# Patient Record
Sex: FEMALE | Race: BLACK OR AFRICAN AMERICAN | NOT HISPANIC OR LATINO | Employment: OTHER
[De-identification: names, ages, dates, MRNs, and addresses within clinical notes are randomized per-mention and may not be internally consistent; named-entity substitution may affect disease eponyms.]

---

## 2018-03-21 ENCOUNTER — RECORDS - HEALTHEAST (OUTPATIENT)
Dept: ADMINISTRATIVE | Facility: OTHER | Age: 48
End: 2018-03-21

## 2020-10-19 ENCOUNTER — HOSPITAL ENCOUNTER (EMERGENCY)
Facility: CLINIC | Age: 50
Discharge: HOME | End: 2020-10-20
Attending: EMERGENCY MEDICINE | Admitting: EMERGENCY MEDICINE
Payer: MEDICARE

## 2020-10-19 DIAGNOSIS — F41.0 PANIC ATTACK: ICD-10-CM

## 2020-10-19 LAB
ALBUMIN SERPL-MCNC: 3.5 G/DL (ref 3.4–5)
ALCOHOL BREATH TEST: 0.1 (ref 0–0.01)
ALP SERPL-CCNC: 71 U/L (ref 40–150)
ALT SERPL W P-5'-P-CCNC: 28 U/L (ref 0–50)
ANION GAP SERPL CALCULATED.3IONS-SCNC: 9 MMOL/L (ref 3–14)
AST SERPL W P-5'-P-CCNC: 29 U/L (ref 0–45)
BASOPHILS # BLD AUTO: 0 10E9/L (ref 0–0.2)
BASOPHILS NFR BLD AUTO: 0.7 %
BILIRUB SERPL-MCNC: 0.5 MG/DL (ref 0.2–1.3)
BUN SERPL-MCNC: 8 MG/DL (ref 7–30)
CA-I BLD-SCNC: 4.5 MG/DL (ref 4.4–5.2)
CALCIUM SERPL-MCNC: 8.6 MG/DL (ref 8.5–10.1)
CHLORIDE SERPL-SCNC: 109 MMOL/L (ref 94–109)
CO2 BLDCOV-SCNC: 18 MMOL/L (ref 21–28)
CO2 SERPL-SCNC: 18 MMOL/L (ref 20–32)
CREAT SERPL-MCNC: 0.8 MG/DL (ref 0.52–1.04)
DIFFERENTIAL METHOD BLD: ABNORMAL
EOSINOPHIL # BLD AUTO: 0.1 10E9/L (ref 0–0.7)
EOSINOPHIL NFR BLD AUTO: 1.2 %
ERYTHROCYTE [DISTWIDTH] IN BLOOD BY AUTOMATED COUNT: 15.5 % (ref 10–15)
ETHANOL SERPL-MCNC: 0.15 G/DL
GFR SERPL CREATININE-BSD FRML MDRD: 87 ML/MIN/{1.73_M2}
GLUCOSE BLD-MCNC: 99 MG/DL (ref 70–99)
GLUCOSE SERPL-MCNC: 94 MG/DL (ref 70–99)
HCT VFR BLD AUTO: 38.4 % (ref 35–47)
HCT VFR BLD CALC: 39 %PCV (ref 35–47)
HGB BLD CALC-MCNC: 13.3 G/DL (ref 11.7–15.7)
HGB BLD-MCNC: 12.4 G/DL (ref 11.7–15.7)
IMM GRANULOCYTES # BLD: 0 10E9/L (ref 0–0.4)
IMM GRANULOCYTES NFR BLD: 0.2 %
LYMPHOCYTES # BLD AUTO: 2.4 10E9/L (ref 0.8–5.3)
LYMPHOCYTES NFR BLD AUTO: 40.5 %
MCH RBC QN AUTO: 28.1 PG (ref 26.5–33)
MCHC RBC AUTO-ENTMCNC: 32.3 G/DL (ref 31.5–36.5)
MCV RBC AUTO: 87 FL (ref 78–100)
MONOCYTES # BLD AUTO: 0.4 10E9/L (ref 0–1.3)
MONOCYTES NFR BLD AUTO: 6.8 %
NEUTROPHILS # BLD AUTO: 3 10E9/L (ref 1.6–8.3)
NEUTROPHILS NFR BLD AUTO: 50.6 %
NRBC # BLD AUTO: 0 10*3/UL
NRBC BLD AUTO-RTO: 0 /100
PCO2 BLDV: 26 MM HG (ref 40–50)
PH BLDV: 7.44 PH (ref 7.32–7.43)
PLATELET # BLD AUTO: 438 10E9/L (ref 150–450)
PO2 BLDV: 50 MM HG (ref 25–47)
POTASSIUM BLD-SCNC: 4.3 MMOL/L (ref 3.4–5.3)
POTASSIUM SERPL-SCNC: 4 MMOL/L (ref 3.4–5.3)
PROT SERPL-MCNC: 7.2 G/DL (ref 6.8–8.8)
RBC # BLD AUTO: 4.42 10E12/L (ref 3.8–5.2)
SAO2 % BLDV FROM PO2: 87 %
SODIUM BLD-SCNC: 137 MMOL/L (ref 133–144)
SODIUM SERPL-SCNC: 137 MMOL/L (ref 133–144)
WBC # BLD AUTO: 5.9 10E9/L (ref 4–11)

## 2020-10-19 PROCEDURE — 80053 COMPREHEN METABOLIC PANEL: CPT | Performed by: EMERGENCY MEDICINE

## 2020-10-19 PROCEDURE — 250N000011 HC RX IP 250 OP 636: Performed by: EMERGENCY MEDICINE

## 2020-10-19 PROCEDURE — 999N001077 HC STATISTIC POTASSIUM ED POCT

## 2020-10-19 PROCEDURE — 99285 EMERGENCY DEPT VISIT HI MDM: CPT | Mod: 25 | Performed by: EMERGENCY MEDICINE

## 2020-10-19 PROCEDURE — 99285 EMERGENCY DEPT VISIT HI MDM: CPT | Performed by: EMERGENCY MEDICINE

## 2020-10-19 PROCEDURE — 999N001079 HC STATISTIC HEMATOCRIT ED POCT

## 2020-10-19 PROCEDURE — 82330 ASSAY OF CALCIUM: CPT

## 2020-10-19 PROCEDURE — 80307 DRUG TEST PRSMV CHEM ANLYZR: CPT | Performed by: EMERGENCY MEDICINE

## 2020-10-19 PROCEDURE — 80320 DRUG SCREEN QUANTALCOHOLS: CPT | Performed by: EMERGENCY MEDICINE

## 2020-10-19 PROCEDURE — 82803 BLOOD GASES ANY COMBINATION: CPT

## 2020-10-19 PROCEDURE — 999N001076 HC STATISTIC SODIUM ED POCT

## 2020-10-19 PROCEDURE — 999N001080 HC STATISTIC GLUCOSE ED POCT

## 2020-10-19 PROCEDURE — 96374 THER/PROPH/DIAG INJ IV PUSH: CPT | Performed by: EMERGENCY MEDICINE

## 2020-10-19 PROCEDURE — 85025 COMPLETE CBC W/AUTO DIFF WBC: CPT | Performed by: EMERGENCY MEDICINE

## 2020-10-19 RX ORDER — SODIUM CHLORIDE 9 MG/ML
INJECTION, SOLUTION INTRAVENOUS CONTINUOUS
Status: DISCONTINUED | OUTPATIENT
Start: 2020-10-20 | End: 2020-10-20 | Stop reason: HOSPADM

## 2020-10-19 RX ORDER — LORAZEPAM 2 MG/ML
0.25 INJECTION INTRAMUSCULAR ONCE
Status: COMPLETED | OUTPATIENT
Start: 2020-10-19 | End: 2020-10-19

## 2020-10-19 RX ADMIN — LORAZEPAM 0.25 MG: 2 INJECTION INTRAMUSCULAR; INTRAVENOUS at 23:18

## 2020-10-19 ASSESSMENT — MIFFLIN-ST. JEOR: SCORE: 1204.22

## 2020-10-19 NOTE — ED AVS SNAPSHOT
[facility] Emergency Department  [address] [address]  Phone: [phone]                                    Sarah Garcias   MRN: [id number]    Department: [facility] Emergency Department   Date of Visit: 10/19/2020           After Visit Summary Signature Page    I have received my discharge instructions, and my questions have been answered. I have discussed any challenges I see with this plan with the nurse or doctor.    ..........................................................................................................................................  Patient/Patient Representative Signature      ..........................................................................................................................................  Patient Representative Print Name and Relationship to Patient    ..................................................               ................................................  Date                                   Time    ..........................................................................................................................................  Reviewed by Signature/Title    ...................................................              ..............................................  Date                                               Time          22EPIC Rev 08/18

## 2020-10-20 VITALS
DIASTOLIC BLOOD PRESSURE: 88 MMHG | SYSTOLIC BLOOD PRESSURE: 126 MMHG | OXYGEN SATURATION: 100 % | HEIGHT: 60 IN | BODY MASS INDEX: 28.47 KG/M2 | HEART RATE: 84 BPM | RESPIRATION RATE: 18 BRPM | TEMPERATURE: 96 F | WEIGHT: 145 LBS

## 2020-10-20 LAB
AMPHETAMINES UR QL SCN: NEGATIVE
BARBITURATES UR QL: NEGATIVE
BENZODIAZ UR QL: NEGATIVE
CANNABINOIDS UR QL SCN: POSITIVE
COCAINE UR QL: NEGATIVE
ETHANOL UR QL SCN: POSITIVE
OPIATES UR QL SCN: NEGATIVE

## 2020-10-20 PROCEDURE — 258N000003 HC RX IP 258 OP 636: Performed by: EMERGENCY MEDICINE

## 2020-10-20 PROCEDURE — 96361 HYDRATE IV INFUSION ADD-ON: CPT | Performed by: EMERGENCY MEDICINE

## 2020-10-20 RX ADMIN — SODIUM CHLORIDE 1000 ML: 9 INJECTION, SOLUTION INTRAVENOUS at 00:17

## 2020-10-20 NOTE — ED PROVIDER NOTES
"      [place] EMERGENCY DEPARTMENT ([facility])  October 19, 2020  History     Chief Complaint   Patient presents with     Panic Attack     The history is provided by the patient and a relative (Pt's sister). The history is limited by the condition of the patient.     Rosalina Garcias is a 50 year old female who presents to the ED today complaining of a panic attack.  Per patient sister, patient's daughter is on the fourth floor ICU of the hospital and the patient is being asked to make end of life decisions about possibly going to comfort care which has been extremely hard on the patient.  Sister reports this triggered a panic attack which has been going on since yesterday at 5 PM.  She reports she has not ate or slept in 24 hours.  Patient reports she drank \"just a little\" of alcohol today.  Patient sister reports she is prescribed hydroxyzine as needed for anxiety, but usually does not take it.  Per patient's sister, she does not believe she is taking her prescribed medication for depression.  Patient denies self-harm, drug use, recent trauma, pain, recent coronavirus exposure, or suicidal ideation or homicidal ideation. Sister confirms that the patient has not made an threats of suicide. Further history is limited due to the condition of the patient.     I have reviewed the Medications, Allergies, Past Medical and Surgical History, and Social History in the [employer] system.  PAST MEDICAL HISTORY: History reviewed. No pertinent past medical history.    PAST SURGICAL HISTORY: History reviewed. No pertinent surgical history.    Past medical history, past surgical history, medications, and allergies were reviewed with the patient. Additional pertinent items: None    FAMILY HISTORY: History reviewed. No pertinent family history.    SOCIAL HISTORY:   Social History     Tobacco Use     Smoking status: Not on file   Substance Use Topics     Alcohol use: Yes     Social history was reviewed with the patient. " Additional pertinent items: None      There are no discharge medications for this patient.         Allergies   Allergen Reactions     Percocet [Oxycodone-Acetaminophen] Anaphylaxis        Review of Systems  A complete review of systems was attempted but limited due to significant anxiety, tearful, screaming, hyperventilating.    Physical Exam   BP: (!) 139/102  Pulse: 118  Temp: 96  F (35.6  C)  Resp: (!) 50  Height: 152.4 cm (5')  Weight: 65.8 kg (145 lb)  SpO2: 94 %      Physical Exam  Vitals signs reviewed.   Constitutional:       Appearance: She is well-developed.      Comments: Significant anxious, screaming, crying, hyperventilating    HENT:      Head: Normocephalic and atraumatic.      Mouth/Throat:      Mouth: Mucous membranes are moist.      Pharynx: No oropharyngeal exudate.   Eyes:      Extraocular Movements: Extraocular movements intact.      Conjunctiva/sclera: Conjunctivae normal.      Pupils: Pupils are equal, round, and reactive to light.   Neck:      Musculoskeletal: Normal range of motion and neck supple. No neck rigidity.   Cardiovascular:      Rate and Rhythm: Regular rhythm. Tachycardia present.      Pulses: Normal pulses.      Heart sounds: Normal heart sounds. No murmur.   Pulmonary:      Effort: No respiratory distress.      Breath sounds: Normal breath sounds. No stridor. No wheezing or rales.      Comments: Hyperventilating due to anxiety but no signs of respiratory distress. No accessory muscle use. Screaming and crying. Satting normally on room air  Abdominal:      General: Bowel sounds are normal. There is no distension.      Palpations: Abdomen is soft. There is no mass.      Tenderness: There is no abdominal tenderness. There is no guarding or rebound.   Musculoskeletal: Normal range of motion.   Skin:     General: Skin is warm and dry.      Capillary Refill: Capillary refill takes less than 2 seconds.      Findings: No rash.   Neurological:      General: No focal deficit present.       Mental Status: She is alert and oriented to person, place, and time.      GCS: GCS eye subscore is 4. GCS verbal subscore is 5. GCS motor subscore is 6.      Cranial Nerves: No cranial nerve deficit.      Sensory: No sensory deficit.      Motor: No weakness.   Psychiatric:         Mood and Affect: Mood is anxious. Affect is tearful.      Comments: Very very anxious, hyperventilating, screaming and crying about her daughter         ED Course   10:16 PM  The patient was seen and examined by Amanda Zavala MD in Room ED06.        Procedures                 Labs Ordered and Resulted from Time of ED Arrival Up to the Time of Departure from the ED   CBC WITH PLATELETS DIFFERENTIAL - Abnormal; Notable for the following components:       Result Value    RDW 15.5 (*)     All other components within normal limits   COMPREHENSIVE METABOLIC PANEL - Abnormal; Notable for the following components:    Carbon Dioxide 18 (*)     All other components within normal limits   ALCOHOL ETHYL - Abnormal; Notable for the following components:    Ethanol g/dL 0.15 (*)     All other components within normal limits   DRUG ABUSE SCREEN 6 CHEM DEP URINE ([facility]) - Abnormal; Notable for the following components:    Cannabinoids Qual Urine Positive (*)     Ethanol Qual Urine Positive (*)     All other components within normal limits   ALCOHOL BREATH TEST POCT - Abnormal; Notable for the following components:    Alcohol Breath Test 0.10 (*)     All other components within normal limits   ISTAT GASES ELEC ICA GLUC NAHED POCT - Abnormal; Notable for the following components:    Ph Venous 7.44 (*)     PCO2 Venous 26 (*)     PO2 Venous 50 (*)     Bicarbonate Venous 18 (*)     All other components within normal limits   ISTAT CG8 GAS ELEC ICA GLUC NAHED NURSE POCT       Medications   LORazepam (ATIVAN) injection 0.25 mg (0.25 mg Intravenous Given 10/19/20 8534)   0.9% sodium chloride BOLUS (0 mLs Intravenous Stopped 10/20/20 0105)             Assessments &  Plan (with Medical Decision Making)   Patient presents with significant anxiety and she is tearful and screaming and hyperventilating talking about her daughter who is dying unfortunately.  This was provoked by her having to have a discussion about potential end-of-life care and comfort care and stopping aggressive measures.  Sister is present with the patient and states that this has been worsening over the past 24 hours and she does have prior history of anxiety and panic attacks.  She apparently has been prescribed hydroxyzine but does not use this.  Patient also reports that she has had some alcohol today.  Patient herself denies any suicidal ideation and the sister also states she has not heard her make any suicidal statements or any gestures.  Patient is hypertensive and tachycardic and hyperventilating related to her significant anxiety.  Though she was given a small amount of 0.25 mg of IV Ativan to try to decrease her anxiety and hyperventilating however wanted to be careful with the fact that she had alcohol on board.  Her breathalyzer was 0.10.  We did obtain basic laboratory studies.  With her hyperventilation I did obtain a VBG which reveals a pH of 7.44 with a PCO2 of 26.  Ativan was significantly helpful and patient was able to calm down and have a full conversation again stating she was not suicidal or homicidal and was feeling much better.  She was describing the stress she was under related to her daughter.  She denies any medical complaints at all at this time.  Remainder of her laboratory studies are largely unremarkable.  She was given IV fluids here and monitored.  Her vital signs returned to normal limits.  Again she was feeling much improved.  We discussed having her speak with one of our behavioral health 's for further resources however she declined this and stated that she would like to go home.  Again her sister stated that she had no concerns for her safety at home and that she  does have a primary care provider that she can follow-up closely with for further discussion and was in agreement with taking her home and states she is going to stay with her for the time being.  They are not going to make any end-of-life decisions today for her daughter.  She was given strict return precautions.  Patient and her sister were in agreement with this plan.  She was discharged in improved condition.    I have reviewed the nursing notes.    I have reviewed the findings, diagnosis, plan and need for follow up with the patient.    There are no discharge medications for this patient.      Final diagnoses:   Panic attack   I, Abdirahman Deleon, am serving as a trained medical scribe to document services personally performed by Amanda Zavala MD, based on the provider's statements to me.     I, Amanda Zavala MD, was physically present and have reviewed and verified the accuracy of this note documented by Abdirahman Deleon.      10/19/2020   [facility] EMERGENCY DEPARTMENT     Amanda Zavala MD  11/15/20 5650

## 2020-10-20 NOTE — DISCHARGE INSTRUCTIONS
Please make an appointment to follow up with Your Primary Care Provider as soon as possible.    Return to the ED if you develop thoughts of harming yourself or others or any new or worsening concerns.

## 2020-10-20 NOTE — ED NOTES
Pt states that she is going home with her sister, Lashell, who is at bedside with her.  They confirm that they are not going to make any of the end of life decisions tonight, as she does have benzodiazepines and ETOH on board.

## 2020-10-20 NOTE — ED NOTES
Pt is alert, talking on the phone.  She is much calmer than on arrival. She continues to deny suicidal ideation.  The family is in contact with our facility  regarding end of life plans for her daughter. She states that she does not want to stay for DEC.  Will update provider.

## 2020-10-20 NOTE — ED TRIAGE NOTES
Pt presents through triage with extreme anxiety/distress r/t needing to take daughter off life support. Pt has been hyperventilating and drinking per family.

## 2021-05-26 ENCOUNTER — RECORDS - HEALTHEAST (OUTPATIENT)
Dept: ADMINISTRATIVE | Facility: CLINIC | Age: 51
End: 2021-05-26

## 2021-05-27 ENCOUNTER — RECORDS - HEALTHEAST (OUTPATIENT)
Dept: ADMINISTRATIVE | Facility: CLINIC | Age: 51
End: 2021-05-27

## 2021-05-28 ENCOUNTER — RECORDS - HEALTHEAST (OUTPATIENT)
Dept: ADMINISTRATIVE | Facility: CLINIC | Age: 51
End: 2021-05-28

## 2021-05-29 ENCOUNTER — RECORDS - HEALTHEAST (OUTPATIENT)
Dept: ADMINISTRATIVE | Facility: CLINIC | Age: 51
End: 2021-05-29

## 2021-06-02 ENCOUNTER — RECORDS - HEALTHEAST (OUTPATIENT)
Dept: ADMINISTRATIVE | Facility: CLINIC | Age: 51
End: 2021-06-02

## 2021-11-02 ENCOUNTER — TELEPHONE (OUTPATIENT)
Dept: NEUROSURGERY | Facility: CLINIC | Age: 51
End: 2021-11-02
Payer: MEDICARE

## 2021-11-02 NOTE — TELEPHONE ENCOUNTER
[facility] Call Center    Phone Message    May a detailed message be left on voicemail: yes     Reason for Call: Appointment Intake    Referring Provider Name: Self referral/2nd opinion  Diagnosis and/or Symptoms:  2nd opinion- surgery complication related to sciatica- all records with [facility]-should be available via Care Everywhere-     Patient would like her lumbar pain.   Writer was unsure if patient should be seen in [facility] or if she should see a neurosurgereon. Patient main complaint is difficulty walking do to sciatica pain and pressure post surgery.    Please advise.     Action Taken: Message routed to:  [facility] [place] [facility]    Travel Screening: Not Applicable

## 2021-11-08 NOTE — TELEPHONE ENCOUNTER
2nd request to schedule an appointment. No call back yet. Please contact.   Electrodesiccation And Curettage Text: The wound bed was treated with electrodesiccation and curettage after the biopsy was performed.

## 2021-11-15 NOTE — TELEPHONE ENCOUNTER
SPINE PATIENTS - NEW PROTOCOL PREVISIT    RECORDS RECEIVED FROM: Self   REASON FOR VISIT: 2ND OPINION/ Surgery complication related to sciatica   Date of Appt: 11/18/21   NOTES (FOR ALL VISITS) STATUS DETAILS   OFFICE NOTE from referring provider N/A    OFFICE NOTE from other specialist Care Everywhere Dr Srinivas Cabral @ [facility] Neurosurgery:  10/12/21  8/10/21   DISCHARGE SUMMARY from hospital N/A    DISCHARGE REPORT from ER Care Everywhere [facility]:  6/23/21  Exploration and repair of lumbar CSF leak    [facility]:  6/17/21  IRRIGATION AND DEBRIDEMENT L4-L5 laminectomy wound and evacuation of post-op hematoma    [facility]:  6/9/21  L4-L5 posterior decompression, inadvertant durotomy   EMG REPORT N/A    MEDICATION LIST Care Everywhere    IMAGING  (FOR ALL VISITS)     MRI (HEAD, NECK, SPINE) Received [facility]:  MRI Lumbar Spine 9/1/21  MRI Lumbar Spine 6/22/21  MRI Lumbar Spine 6/16/21    [facility]:  MRI Lumbar Spine 5/18/21  MRI Lumbar Spine 2/22/21   XRAY (SPINE) *NEUROSURGERY* Received [facility]:  XR Lumbar Spine 10/12/21   CT (HEAD, NECK, SPINE) N/A       Action 11/15/21 MV 9.55am   Action Taken Imaging request faxed to [facility] for:  MRI Lumbar Spine 9/1/21  MRI Lumbar Spine 6/22/21  MRI Lumbar Spine 6/16/21    Imaging request faxed to  for:  MRI Lumbar Spine 5/18/21  MRI Lumbar Spine 2/22/21  XR Lumbar Spine 10/12/21    --11/16/21 MV 2.51pm--  2nd request faxed for images    --11/17/21 MV 8.51am--  Images resolved in PACS

## 2021-11-17 DIAGNOSIS — M41.9 SCOLIOSIS: Primary | ICD-10-CM

## 2021-11-18 ENCOUNTER — OFFICE VISIT (OUTPATIENT)
Dept: NEUROSURGERY | Facility: CLINIC | Age: 51
End: 2021-11-18
Payer: MEDICARE

## 2021-11-18 ENCOUNTER — PRE VISIT (OUTPATIENT)
Dept: NEUROSURGERY | Facility: CLINIC | Age: 51
End: 2021-11-18

## 2021-11-18 ENCOUNTER — ANCILLARY PROCEDURE (OUTPATIENT)
Dept: GENERAL RADIOLOGY | Facility: CLINIC | Age: 51
End: 2021-11-18
Attending: NEUROLOGICAL SURGERY
Payer: MEDICARE

## 2021-11-18 VITALS
OXYGEN SATURATION: 98 % | RESPIRATION RATE: 16 BRPM | DIASTOLIC BLOOD PRESSURE: 101 MMHG | SYSTOLIC BLOOD PRESSURE: 145 MMHG | HEART RATE: 89 BPM | WEIGHT: 155 LBS | BODY MASS INDEX: 30.27 KG/M2

## 2021-11-18 DIAGNOSIS — M41.9 SCOLIOSIS: ICD-10-CM

## 2021-11-18 DIAGNOSIS — M54.41 CHRONIC BILATERAL LOW BACK PAIN WITH RIGHT-SIDED SCIATICA: Primary | ICD-10-CM

## 2021-11-18 DIAGNOSIS — G89.29 CHRONIC BILATERAL LOW BACK PAIN WITH RIGHT-SIDED SCIATICA: Primary | ICD-10-CM

## 2021-11-18 PROCEDURE — 72082 X-RAY EXAM ENTIRE SPI 2/3 VW: CPT | Performed by: STUDENT IN AN ORGANIZED HEALTH CARE EDUCATION/TRAINING PROGRAM

## 2021-11-18 PROCEDURE — 77073 BONE LENGTH STUDIES: CPT | Performed by: STUDENT IN AN ORGANIZED HEALTH CARE EDUCATION/TRAINING PROGRAM

## 2021-11-18 PROCEDURE — 99204 OFFICE O/P NEW MOD 45 MIN: CPT | Performed by: NEUROLOGICAL SURGERY

## 2021-11-18 RX ORDER — GABAPENTIN 300 MG/1
900 CAPSULE ORAL
COMMUNITY
Start: 2021-07-30 | End: 2022-07-30

## 2021-11-18 NOTE — LETTER
"11/18/2021       RE: Sarah Garcias  [address]  [place] [address]     Dear Colleague,    Thank you for referring your patient, Sarah Garcias, to the [facility] NEUROSURGERY CLINIC [place] at [facility]. Please see a copy of my visit note below.        Neurosurgery Clinic Note    Chief Complaint: back pain    History of Present Illness:  It was a pleasure to evaluate Sarah Garcias in clinic today     Sarah Garcias is a 51 year old female with several lumbar surgeries in 2021  at [facility], she is still undergoing postoperative care at [facility] with active recommendations there, presenting for an apparent second opinion here.    Sarah presents with back pain as primary complaint, worse with sitting, better with standing, not worse with walking, intermittent radiating to right anterior thigh with walking. No recent injections. She states she had a lumbar laminectomy in June 2021 at [facility] with Dr. Rojo and had to return to OR twice, once for a hematoma evacuation with Dr. Xie and once for a CSF leak repair with Dr. Xie. Since then she has had significant back pain. She has not had any spinal injections. She does not have any headaches. She feels like the pain is a \"coiled spring\" in the center of her back that is worse when she sits for a while and better once she starts moving.          IMAGING per my own measurement and interpretation:  Xrays:11/18/21 no significant sagittal malalignment  Mild scoliosis, no significant coronal malalignment                MRI lumbar 9/1/21 with mild disc bulges L4-5, L5-S1, no significant central stenosis, mild foraminal stenosis    Resulted Imaging/Labs:  Bone Density:  No results found.    Vitamin D:  Vitamin D Deficiency Screening Results:  No results found for: VITDT  No results found for: PRE133, YFEZ698, [id number], VITD3, D2VIT, D3VIT, DTOT, [id number], [id number], [id number], " [id number], [id number], [id number]      Nutritional Status:  Estimated body mass index is 28.32 kg/m  as calculated from the following:    Height as of 10/19/20: 1.524 m (5').    Weight as of 10/19/20: 65.8 kg (145 lb).    Lab Results   Component Value Date    ALBUMIN 3.5 10/19/2020       Diabetes Screening:  No results found for: A1C    Nicotine Usage:  Yes- active smoker                Physical Exam   BP (!) 145/101   Pulse 89   Resp 16   Wt 70.3 kg (155 lb)   SpO2 98%   BMI 30.27 kg/m      Constitutional: Oriented to person, place, and time. Appears well-developed and well-nourished. Cooperative. No distress.   Musculoskeletal:     Lumbar flexion/extension range of motion: severely limited to less than 5 degrees due to pain and muscle spasm  Incision midline well-healed, no ballottable fluid collection, + paraspinous muscle spasm  Neurological: alert and oriented to person, place, and time.     Gait severely antalgic      Reflex Scores: 3+ diffuse biceps/patellar/Achilles    STRENGTH LEFT RIGHT   Deltoid 5 5   Bicep 5 5   Wrist Extensor 5 5   Tricep 5 5   Finger flexion 5 5   Finger abduction 4 4    4 4       Hip Flexion     5     5   Knee Extension 5 5   Ankle Dorsiflexion 5 5   Extensor Hallucis Longus 5 5   Plantar Flexion 5 5   Foot eversion 5 5   Foot inversion 5 5             Skin: Skin is warm, dry and intact.   Psychiatric: Normal mood and affect. Speech is normal and behavior is normal.        ASSESSMENT:  Sarah Garcias is a 51 year old female with low back pain and muscle spasm five months after three lumbar surgeries in June 2021 at [facility]    PLAN:    No significant sagittal or coronal malalignment  Lumbar MRI shows well-decompressed canal. She does not have any evidence of ongoing CSF leakage.  I do not recommend surgical intervention for muscle spasm; recommend acupuncture, massage, chiropractic, and trying the Robaxin medication prescribed by [facility] team as well as NSAIDs as  tolerated.  Patient will follow-up with [facility] team; no further followup needed with me.    Gaby Mccall MD    [facility] Department of Neurosurgery  Complex Spinal Deformity, Scoliosis, and Minimally Invasive Spine Surgery Specialist  Office: [phone]    11/18/2021            I performed independent visualization of radiographic imaging and entered my own interpretation, reviewed and/or ordered tests in radiology, made the decision to obtain old records and/or history from someone other than the patient and Reviewed and summarized old records and/or discussed this case with another health care provider        Again, thank you for allowing me to participate in the care of your patient.      Sincerely,    Gaby Mccall MD

## 2021-11-18 NOTE — PATIENT INSTRUCTIONS
Dr. Mccall does not recommend further surgery at this time. She recommends acupuncture, massage, chiropractic, and trying the Robaxin medication prescribed by [facility] team as well as NSAIDs if tolerated to help your pain. You can also try the injections ordered by your [facility] team.  Please follow-up with your current spine care team at [facility], no further followup needed with Dr. Mccall.

## 2021-11-18 NOTE — LETTER
Date:January 4, 2022      Patient was self referred, no letter generated. Do not send.        [facility] Health Information

## 2021-11-18 NOTE — PROGRESS NOTES
"    Neurosurgery Clinic Note    Chief Complaint: back pain    History of Present Illness:  It was a pleasure to evaluate Sarah Garcias in clinic today     Sarah Garcias is a 51 year old female with several lumbar surgeries in 2021  at [facility], she is still undergoing postoperative care at [facility] with active recommendations there, presenting for an apparent second opinion here.    Sarah presents with back pain as primary complaint, worse with sitting, better with standing, not worse with walking, intermittent radiating to right anterior thigh with walking. No recent injections. She states she had a lumbar laminectomy in June 2021 at [facility] with Dr. Rojo and had to return to OR twice, once for a hematoma evacuation with Dr. Xie and once for a CSF leak repair with Dr. Xie. Since then she has had significant back pain. She has not had any spinal injections. She does not have any headaches. She feels like the pain is a \"coiled spring\" in the center of her back that is worse when she sits for a while and better once she starts moving.          IMAGING per my own measurement and interpretation:  Xrays:11/18/21 no significant sagittal malalignment  Mild scoliosis, no significant coronal malalignment                MRI lumbar 9/1/21 with mild disc bulges L4-5, L5-S1, no significant central stenosis, mild foraminal stenosis    Resulted Imaging/Labs:  Bone Density:  No results found.    Vitamin D:  Vitamin D Deficiency Screening Results:  No results found for: VITDT  No results found for: IBN583, TGWS681, [id number], VITD3, D2VIT, D3VIT, DTOT, [id number], [id number], [id number], [id number], [id number], [id number]      Nutritional Status:  Estimated body mass index is 28.32 kg/m  as calculated from the following:    Height as of 10/19/20: 1.524 m (5').    Weight as of 10/19/20: 65.8 kg (145 lb).    Lab Results   Component Value Date    ALBUMIN 3.5 10/19/2020       Diabetes Screening:  No results found " for: A1C    Nicotine Usage:  Yes- active smoker                Physical Exam   BP (!) 145/101   Pulse 89   Resp 16   Wt 70.3 kg (155 lb)   SpO2 98%   BMI 30.27 kg/m      Constitutional: Oriented to person, place, and time. Appears well-developed and well-nourished. Cooperative. No distress.   Musculoskeletal:     Lumbar flexion/extension range of motion: severely limited to less than 5 degrees due to pain and muscle spasm  Incision midline well-healed, no ballottable fluid collection, + paraspinous muscle spasm  Neurological: alert and oriented to person, place, and time.     Gait severely antalgic      Reflex Scores: 3+ diffuse biceps/patellar/Achilles    STRENGTH LEFT RIGHT   Deltoid 5 5   Bicep 5 5   Wrist Extensor 5 5   Tricep 5 5   Finger flexion 5 5   Finger abduction 4 4    4 4       Hip Flexion     5     5   Knee Extension 5 5   Ankle Dorsiflexion 5 5   Extensor Hallucis Longus 5 5   Plantar Flexion 5 5   Foot eversion 5 5   Foot inversion 5 5             Skin: Skin is warm, dry and intact.   Psychiatric: Normal mood and affect. Speech is normal and behavior is normal.        ASSESSMENT:  Sarah Garcias is a 51 year old female with low back pain and muscle spasm five months after three lumbar surgeries in June 2021 at [facility]    PLAN:    No significant sagittal or coronal malalignment  Lumbar MRI shows well-decompressed canal. She does not have any evidence of ongoing CSF leakage.  I do not recommend surgical intervention for muscle spasm; recommend acupuncture, massage, chiropractic, and trying the Robaxin medication prescribed by [facility] team as well as NSAIDs as tolerated.  Patient will follow-up with [facility] team; no further followup needed with me.    Gaby Mccall MD    [facility] Department of Neurosurgery  Complex Spinal Deformity, Scoliosis, and Minimally Invasive Spine Surgery Specialist  Office: [phone]    11/18/2021            I performed  independent visualization of radiographic imaging and entered my own interpretation, reviewed and/or ordered tests in radiology, made the decision to obtain old records and/or history from someone other than the patient and Reviewed and summarized old records and/or discussed this case with another health care provider

## 2021-11-19 ASSESSMENT — PATIENT HEALTH QUESTIONNAIRE - PHQ9: SUM OF ALL RESPONSES TO PHQ QUESTIONS 1-9: 4

## 2021-11-29 ENCOUNTER — TELEPHONE (OUTPATIENT)
Dept: NEUROSURGERY | Facility: CLINIC | Age: 51
End: 2021-11-29
Payer: MEDICARE

## 2021-11-29 NOTE — TELEPHONE ENCOUNTER
[facility]    Phone Message    May a detailed message be left on voicemail: yes     Reason for Call: Requesting Results   Name/type of test: XR   Patient had MRI on 09/01/21 at [facility] and would like Dr. Mccall to view via Care Everywhere-  Patient saw Dr. Mccall on 11/18/21 and would like to have Dr. Mccall view MRI and discuss results of MRI and XR  Date of test: 09/01/21 and 11/18/21  Was test done at a location other than [facility] (Please fill in the location if not [facility])?: Yes: [facility] for MRI      Action Taken: Message routed to:  [facility] ([facility]): NEUROSURGERY    Travel Screening: Not Applicable

## 2021-11-30 ENCOUNTER — TELEPHONE (OUTPATIENT)
Dept: NEUROSURGERY | Facility: CLINIC | Age: 51
End: 2021-11-30
Payer: MEDICARE

## 2022-05-07 ENCOUNTER — HEALTH MAINTENANCE LETTER (OUTPATIENT)
Age: 52
End: 2022-05-07

## 2022-09-26 ENCOUNTER — PREP FOR PROCEDURE (OUTPATIENT)
Dept: OTOLARYNGOLOGY | Facility: CLINIC | Age: 52
End: 2022-09-26

## 2022-09-26 DIAGNOSIS — J38.7 LESION OF LARYNX: Primary | ICD-10-CM

## 2022-09-26 RX ORDER — DEXAMETHASONE SODIUM PHOSPHATE 4 MG/ML
10 INJECTION, SOLUTION INTRA-ARTICULAR; INTRALESIONAL; INTRAMUSCULAR; INTRAVENOUS; SOFT TISSUE ONCE
Status: CANCELLED | OUTPATIENT
Start: 2022-09-26 | End: 2022-09-26

## 2022-11-01 ENCOUNTER — TRANSCRIBE ORDERS (OUTPATIENT)
Dept: OTHER | Age: 52
End: 2022-11-01

## 2022-11-01 DIAGNOSIS — M51.06 DISC DISEASE WITH MYELOPATHY, LUMBAR: Primary | ICD-10-CM

## 2022-11-10 ENCOUNTER — TELEPHONE (OUTPATIENT)
Dept: OTOLARYNGOLOGY | Facility: CLINIC | Age: 52
End: 2022-11-10

## 2022-11-10 ENCOUNTER — HOSPITAL ENCOUNTER (OUTPATIENT)
Facility: AMBULATORY SURGERY CENTER | Age: 52
End: 2022-11-10
Attending: OTOLARYNGOLOGY
Payer: MEDICARE

## 2022-11-10 DIAGNOSIS — J38.7 LESION OF LARYNX: ICD-10-CM

## 2022-11-10 NOTE — TELEPHONE ENCOUNTER
Called patient to schedule surgery with Dr. 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

## 2022-12-26 ENCOUNTER — HEALTH MAINTENANCE LETTER (OUTPATIENT)
Age: 52
End: 2022-12-26

## 2023-01-23 ENCOUNTER — TELEPHONE (OUTPATIENT)
Dept: OTOLARYNGOLOGY | Facility: CLINIC | Age: 53
End: 2023-01-23
Payer: MEDICARE

## 2023-01-26 RX ORDER — HYDROMORPHONE HYDROCHLORIDE 1 MG/ML
0.4 INJECTION, SOLUTION INTRAMUSCULAR; INTRAVENOUS; SUBCUTANEOUS EVERY 5 MIN PRN
Status: CANCELLED | OUTPATIENT
Start: 2023-01-26

## 2023-01-26 RX ORDER — SODIUM CHLORIDE, SODIUM LACTATE, POTASSIUM CHLORIDE, CALCIUM CHLORIDE 600; 310; 30; 20 MG/100ML; MG/100ML; MG/100ML; MG/100ML
INJECTION, SOLUTION INTRAVENOUS CONTINUOUS
Status: CANCELLED | OUTPATIENT
Start: 2023-01-26

## 2023-01-26 RX ORDER — FENTANYL CITRATE 50 UG/ML
25 INJECTION, SOLUTION INTRAMUSCULAR; INTRAVENOUS EVERY 5 MIN PRN
Status: CANCELLED | OUTPATIENT
Start: 2023-01-26

## 2023-01-26 RX ORDER — LIDOCAINE 40 MG/G
CREAM TOPICAL
Status: CANCELLED | OUTPATIENT
Start: 2023-01-26

## 2023-01-26 RX ORDER — FENTANYL CITRATE 50 UG/ML
50 INJECTION, SOLUTION INTRAMUSCULAR; INTRAVENOUS EVERY 5 MIN PRN
Status: CANCELLED | OUTPATIENT
Start: 2023-01-26

## 2023-01-26 RX ORDER — HYDROMORPHONE HYDROCHLORIDE 1 MG/ML
0.2 INJECTION, SOLUTION INTRAMUSCULAR; INTRAVENOUS; SUBCUTANEOUS EVERY 5 MIN PRN
Status: CANCELLED | OUTPATIENT
Start: 2023-01-26

## 2023-01-26 RX ORDER — ONDANSETRON 2 MG/ML
4 INJECTION INTRAMUSCULAR; INTRAVENOUS EVERY 30 MIN PRN
Status: CANCELLED | OUTPATIENT
Start: 2023-01-26

## 2023-01-26 RX ORDER — ONDANSETRON 4 MG/1
4 TABLET, ORALLY DISINTEGRATING ORAL EVERY 30 MIN PRN
Status: CANCELLED | OUTPATIENT
Start: 2023-01-26

## 2023-04-16 ENCOUNTER — HEALTH MAINTENANCE LETTER (OUTPATIENT)
Age: 53
End: 2023-04-16

## 2023-06-02 ENCOUNTER — HEALTH MAINTENANCE LETTER (OUTPATIENT)
Age: 53
End: 2023-06-02

## 2024-02-04 ENCOUNTER — HEALTH MAINTENANCE LETTER (OUTPATIENT)
Age: 54
End: 2024-02-04

## 2024-06-23 ENCOUNTER — HEALTH MAINTENANCE LETTER (OUTPATIENT)
Age: 54
End: 2024-06-23